# Patient Record
Sex: MALE | Race: WHITE | Employment: FULL TIME | URBAN - METROPOLITAN AREA
[De-identification: names, ages, dates, MRNs, and addresses within clinical notes are randomized per-mention and may not be internally consistent; named-entity substitution may affect disease eponyms.]

---

## 2018-05-28 ENCOUNTER — APPOINTMENT (OUTPATIENT)
Dept: GENERAL RADIOLOGY | Age: 58
End: 2018-05-28
Payer: COMMERCIAL

## 2018-05-28 ENCOUNTER — HOSPITAL ENCOUNTER (EMERGENCY)
Age: 58
Discharge: HOME | End: 2018-05-28
Attending: EMERGENCY MEDICINE
Payer: COMMERCIAL

## 2018-05-28 VITALS
HEIGHT: 69 IN | TEMPERATURE: 98.1 F | OXYGEN SATURATION: 98 % | HEART RATE: 72 BPM | SYSTOLIC BLOOD PRESSURE: 110 MMHG | BODY MASS INDEX: 34.07 KG/M2 | DIASTOLIC BLOOD PRESSURE: 76 MMHG | WEIGHT: 230 LBS | RESPIRATION RATE: 16 BRPM

## 2018-05-28 DIAGNOSIS — S69.92XA INJURY OF LEFT HAND, INITIAL ENCOUNTER: Primary | ICD-10-CM

## 2018-05-28 DIAGNOSIS — S63.615A SPRAIN OF LEFT RING FINGER, UNSPECIFIED SITE OF FINGER, INITIAL ENCOUNTER: ICD-10-CM

## 2018-05-28 PROCEDURE — 99283 EMERGENCY DEPT VISIT LOW MDM: CPT

## 2018-05-28 PROCEDURE — 73140 X-RAY EXAM OF FINGER(S): CPT

## 2018-05-28 RX ORDER — NAPROXEN 500 MG/1
500 TABLET ORAL 2 TIMES DAILY
Qty: 30 TABLET | Refills: 0 | Status: SHIPPED | OUTPATIENT
Start: 2018-05-28 | End: 2018-06-12

## 2018-05-28 ASSESSMENT — PAIN DESCRIPTION - ORIENTATION
ORIENTATION: LEFT
ORIENTATION: LEFT

## 2018-05-28 ASSESSMENT — PAIN SCALES - GENERAL: PAINLEVEL_OUTOF10: 5

## 2018-05-28 ASSESSMENT — PAIN DESCRIPTION - LOCATION
LOCATION: FINGER (COMMENT WHICH ONE)
LOCATION: FINGER (COMMENT WHICH ONE)

## 2018-05-28 ASSESSMENT — PAIN DESCRIPTION - PAIN TYPE
TYPE: ACUTE PAIN
TYPE: ACUTE PAIN

## 2018-05-28 ASSESSMENT — PAIN - FUNCTIONAL ASSESSMENT: PAIN_FUNCTIONAL_ASSESSMENT: ADVANCED DEMENTIA

## 2018-05-30 ASSESSMENT — ENCOUNTER SYMPTOMS
EYE REDNESS: 0
VOMITING: 0
SHORTNESS OF BREATH: 0
NAUSEA: 0
ABDOMINAL PAIN: 0

## 2024-02-21 ENCOUNTER — OFFICE VISIT (OUTPATIENT)
Dept: UROLOGY | Facility: CLINIC | Age: 64
End: 2024-02-21
Payer: COMMERCIAL

## 2024-02-21 VITALS — BODY MASS INDEX: 31.01 KG/M2 | TEMPERATURE: 98 F | WEIGHT: 210 LBS

## 2024-02-21 DIAGNOSIS — N52.9 ERECTILE DYSFUNCTION, UNSPECIFIED ERECTILE DYSFUNCTION TYPE: ICD-10-CM

## 2024-02-21 DIAGNOSIS — Z09 ENCOUNTER FOR FOLLOW-UP: Primary | ICD-10-CM

## 2024-02-21 DIAGNOSIS — N48.6 PEYRONIE'S DISEASE: ICD-10-CM

## 2024-02-21 PROCEDURE — 99215 OFFICE O/P EST HI 40 MIN: CPT | Performed by: UROLOGY

## 2024-02-21 PROCEDURE — 87086 URINE CULTURE/COLONY COUNT: CPT

## 2024-02-21 PROCEDURE — 1036F TOBACCO NON-USER: CPT | Performed by: UROLOGY

## 2024-02-21 RX ORDER — LOSARTAN POTASSIUM 50 MG/1
50 TABLET ORAL DAILY
COMMUNITY
Start: 2023-12-11

## 2024-02-21 RX ORDER — CHLORHEXIDINE GLUCONATE 40 MG/ML
SOLUTION TOPICAL DAILY PRN
Status: CANCELLED | OUTPATIENT
Start: 2024-02-21

## 2024-02-21 RX ORDER — BLOOD-GLUCOSE METER
KIT MISCELLANEOUS
COMMUNITY
Start: 2023-12-11

## 2024-02-21 RX ORDER — ACETAMINOPHEN 325 MG/1
975 TABLET ORAL ONCE
Status: CANCELLED | OUTPATIENT
Start: 2024-02-21 | End: 2024-02-21

## 2024-02-21 RX ORDER — METOPROLOL TARTRATE 25 MG/1
25 TABLET, FILM COATED ORAL 2 TIMES DAILY
COMMUNITY

## 2024-02-21 RX ORDER — FLUCONAZOLE 2 MG/ML
400 INJECTION, SOLUTION INTRAVENOUS ONCE
Status: CANCELLED | OUTPATIENT
Start: 2024-02-21 | End: 2024-02-21

## 2024-02-21 RX ORDER — GABAPENTIN 600 MG/1
600 TABLET ORAL ONCE
Status: CANCELLED | OUTPATIENT
Start: 2024-02-21 | End: 2024-02-21

## 2024-02-21 RX ORDER — SODIUM CHLORIDE, SODIUM LACTATE, POTASSIUM CHLORIDE, CALCIUM CHLORIDE 600; 310; 30; 20 MG/100ML; MG/100ML; MG/100ML; MG/100ML
100 INJECTION, SOLUTION INTRAVENOUS CONTINUOUS
Status: CANCELLED | OUTPATIENT
Start: 2024-02-21

## 2024-02-21 RX ORDER — ASPIRIN 81 MG/1
1 TABLET ORAL DAILY
COMMUNITY
Start: 2021-03-29

## 2024-02-21 RX ORDER — ATORVASTATIN CALCIUM 40 MG/1
40 TABLET, FILM COATED ORAL NIGHTLY
COMMUNITY

## 2024-02-21 RX ORDER — CELECOXIB 400 MG/1
400 CAPSULE ORAL ONCE
Status: CANCELLED | OUTPATIENT
Start: 2024-02-21 | End: 2024-02-21

## 2024-02-21 RX ORDER — LANCETS 28 GAUGE
EACH MISCELLANEOUS
COMMUNITY
Start: 2023-12-11

## 2024-02-21 RX ORDER — FAMOTIDINE 20 MG/1
20 TABLET, FILM COATED ORAL 2 TIMES DAILY
COMMUNITY
Start: 2023-12-11

## 2024-02-21 NOTE — PROGRESS NOTES
HPI  63 y.o. with DM and HL referred by Dr. Mas for penile curvature      Last seen 12/2022:  -will plan on IPP and modeling (pending [employer] insurance verification)  -urine cx / PVR  -traditional reservoir placement  -Vanc/Zosyn/Fluconazole  -discussed improving glycemic control    Todays Visit:  #Peyronie's Disease  -no change  -curve still 50 degrees up      Duration of curvature- years   pain with erection- sometimes   which way does the curve go- up , not painful unless very erect   degree of curvature- 30   history of trauma- no      #Erectile Dysfunction  -interested in IPP, failed pills and injections  -60% rigidity with mix 5 --> 50 degrees dorsal  -condition not improving over past year, worsening  DM better controlled     -s/p prostatectomy: no  -on nitrates/NTG?: no  -smoker? quit 20 years   -hernia repair- no   -partner-       -Tried PDE5is?: Yes  ---Viagra/sildenafil - response: ineffective   ---Cialis/Tadalafil- response: Cialis 20 mg as needed- used a few times- effective   - Libido/Desire: strong  - been on Testosterone /anabolic steroids? no   -Premature or delayed ejaculation: none     Labs reviewed: T normal  UCx negative     Labs 5/2022:  A1c 5.5  Cr .6     Raising their grandkids - 3  works in  at [employer] - [employer] - works 16 days, has 12 off     Current Medications:  No current outpatient medications on file.     No current facility-administered medications for this visit.        PMH:  No past medical history on file.    PSH:  No past surgical history on file.    FMH:  No family history on file.    Allergies:  Not on File    Physical Exam   CONSTITUTIONAL:        No acute distress    HEAD:        Normocephalic and atraumatic    CHEST / RESPIRATORY      no excess work of breathing, no respiratory distress,    ABDOMEN / GASTROINTESTINAL:        Abdomen nondistended    Testicles descended bilaterally, nontender, no masses  Vasa palpable bilaterally  Penis  circ'd, no lesions, dorsal plaque noted      Assessment/Plan  #Severe Erectile Dysfunction and Peyronies disease refractory to injections and pills - We discussed the etiology and natural progression of PD. Would not be candidate for Xiaflex given poor erections. Not a candidate for ICI given poor respose.      We had a long discussion regarding penile prostheses , including risks, benefits, and alternatives. We discussed risks including bleeding, infection, damage to adjacent structures, need for further procedures, malfunction, erosion, extrusion, etc. We discussed the postoperative course and expectations, and specifically that after getting an implant, other methods such as injections etc are no longer effective. All questions were answered and reading materials were given. T     -will plan on IPP and modeling (NB)  -urine cx / PVR  -traditional reservoir placement  -Vanc/Zosyn/Fluconazole  -no lloyd postop     #diabetes  -discussed improving glycemic control   -A1c today  -cr today    fu for surgery        Scribe Attestation  By signing my name below, I, Helen Patel-Rudy, Shari, attest that this documentation  has been prepared under the direction and in the presence of Charla Yang MD.

## 2024-02-22 PROBLEM — N48.6 PEYRONIE'S DISEASE: Status: ACTIVE | Noted: 2024-02-21

## 2024-02-22 PROBLEM — N52.9 ERECTILE DYSFUNCTION: Status: ACTIVE | Noted: 2024-02-21

## 2024-02-22 LAB — BACTERIA UR CULT: NO GROWTH

## 2024-03-18 ENCOUNTER — TELEMEDICINE CLINICAL SUPPORT (OUTPATIENT)
Dept: PREADMISSION TESTING | Facility: HOSPITAL | Age: 64
End: 2024-03-18
Payer: COMMERCIAL

## 2024-03-18 DIAGNOSIS — N52.9 ERECTILE DYSFUNCTION, UNSPECIFIED ERECTILE DYSFUNCTION TYPE: ICD-10-CM

## 2024-03-18 DIAGNOSIS — N48.6 PEYRONIE'S DISEASE: ICD-10-CM

## 2024-03-18 DIAGNOSIS — Z09 ENCOUNTER FOR FOLLOW-UP: ICD-10-CM

## 2024-03-18 RX ORDER — CHOLECALCIFEROL (VITAMIN D3) 50 MCG
50 TABLET ORAL DAILY
COMMUNITY

## 2024-03-18 RX ORDER — ZINC GLUCONATE 50 MG
50 TABLET ORAL DAILY
COMMUNITY

## 2024-03-18 RX ORDER — BISMUTH SUBSALICYLATE 262 MG
1 TABLET,CHEWABLE ORAL DAILY
COMMUNITY

## 2024-03-18 RX ORDER — IBUPROFEN 100 MG/5ML
2000 SUSPENSION, ORAL (FINAL DOSE FORM) ORAL DAILY
COMMUNITY

## 2024-03-18 RX ORDER — ACETAMINOPHEN, DIPHENHYDRAMINE HCL, PHENYLEPHRINE HCL 325; 25; 5 MG/1; MG/1; MG/1
1 TABLET ORAL NIGHTLY PRN
COMMUNITY

## 2024-03-18 RX ORDER — KRILL/OM-3/DHA/EPA/PHOSPHO/AST 1000-170MG
CAPSULE ORAL
COMMUNITY
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
## 2024-03-22 ENCOUNTER — OFFICE VISIT (OUTPATIENT)
Dept: UROLOGY | Facility: HOSPITAL | Age: 64
End: 2024-03-22
Payer: COMMERCIAL

## 2024-03-22 DIAGNOSIS — N48.6 PEYRONIE'S DISEASE: ICD-10-CM

## 2024-03-22 DIAGNOSIS — N52.9 ERECTILE DYSFUNCTION, UNSPECIFIED ERECTILE DYSFUNCTION TYPE: Primary | ICD-10-CM

## 2024-03-22 LAB
POC APPEARANCE, URINE: CLEAR
POC BILIRUBIN, URINE: NEGATIVE
POC BLOOD, URINE: ABNORMAL
POC COLOR, URINE: YELLOW
POC GLUCOSE, URINE: NEGATIVE MG/DL
POC KETONES, URINE: ABNORMAL MG/DL
POC LEUKOCYTES, URINE: NEGATIVE
POC NITRITE,URINE: NEGATIVE
POC PH, URINE: 5.5 PH
POC PROTEIN, URINE: NEGATIVE MG/DL
POC SPECIFIC GRAVITY, URINE: >=1.03
POC UROBILINOGEN, URINE: 0.2 EU/DL

## 2024-03-22 PROCEDURE — 99213 OFFICE O/P EST LOW 20 MIN: CPT | Performed by: NURSE PRACTITIONER

## 2024-03-22 PROCEDURE — 81003 URINALYSIS AUTO W/O SCOPE: CPT | Performed by: NURSE PRACTITIONER

## 2024-03-22 PROCEDURE — 1036F TOBACCO NON-USER: CPT | Performed by: NURSE PRACTITIONER

## 2024-03-26 ENCOUNTER — PRE-ADMISSION TESTING (OUTPATIENT)
Dept: PREADMISSION TESTING | Facility: HOSPITAL | Age: 64
End: 2024-03-26
Payer: COMMERCIAL

## 2024-03-26 ENCOUNTER — LAB (OUTPATIENT)
Dept: LAB | Facility: LAB | Age: 64
End: 2024-03-26
Payer: COMMERCIAL

## 2024-03-26 VITALS
OXYGEN SATURATION: 94 % | TEMPERATURE: 97.2 F | RESPIRATION RATE: 18 BRPM | SYSTOLIC BLOOD PRESSURE: 124 MMHG | HEART RATE: 83 BPM | HEIGHT: 69 IN | BODY MASS INDEX: 31.35 KG/M2 | DIASTOLIC BLOOD PRESSURE: 87 MMHG | WEIGHT: 211.64 LBS

## 2024-03-26 DIAGNOSIS — R73.09 ELEVATED RANDOM BLOOD GLUCOSE LEVEL: Primary | ICD-10-CM

## 2024-03-26 DIAGNOSIS — R73.09 ELEVATED RANDOM BLOOD GLUCOSE LEVEL: ICD-10-CM

## 2024-03-26 DIAGNOSIS — Z01.818 PREPROCEDURAL EXAMINATION: ICD-10-CM

## 2024-03-26 LAB
ANION GAP SERPL CALC-SCNC: 14 MMOL/L (ref 10–20)
APPEARANCE UR: CLEAR
BASOPHILS # BLD AUTO: 0.04 X10*3/UL (ref 0–0.1)
BASOPHILS NFR BLD AUTO: 0.8 %
BILIRUB UR STRIP.AUTO-MCNC: NEGATIVE MG/DL
BUN SERPL-MCNC: 18 MG/DL (ref 6–23)
CALCIUM SERPL-MCNC: 9.6 MG/DL (ref 8.6–10.3)
CHLORIDE SERPL-SCNC: 100 MMOL/L (ref 98–107)
CO2 SERPL-SCNC: 27 MMOL/L (ref 21–32)
COLOR UR: NORMAL
CREAT SERPL-MCNC: 0.89 MG/DL (ref 0.5–1.3)
EGFRCR SERPLBLD CKD-EPI 2021: >90 ML/MIN/1.73M*2
EOSINOPHIL # BLD AUTO: 0.16 X10*3/UL (ref 0–0.7)
EOSINOPHIL NFR BLD AUTO: 3.2 %
ERYTHROCYTE [DISTWIDTH] IN BLOOD BY AUTOMATED COUNT: 11.8 % (ref 11.5–14.5)
EST. AVERAGE GLUCOSE BLD GHB EST-MCNC: 154 MG/DL
GLUCOSE SERPL-MCNC: 176 MG/DL (ref 74–99)
GLUCOSE UR STRIP.AUTO-MCNC: NORMAL MG/DL
HBA1C MFR BLD: 7 %
HCT VFR BLD AUTO: 44.1 % (ref 41–52)
HGB BLD-MCNC: 15.4 G/DL (ref 13.5–17.5)
IMM GRANULOCYTES # BLD AUTO: 0.01 X10*3/UL (ref 0–0.7)
IMM GRANULOCYTES NFR BLD AUTO: 0.2 % (ref 0–0.9)
KETONES UR STRIP.AUTO-MCNC: NEGATIVE MG/DL
LEUKOCYTE ESTERASE UR QL STRIP.AUTO: NEGATIVE
LYMPHOCYTES # BLD AUTO: 2 X10*3/UL (ref 1.2–4.8)
LYMPHOCYTES NFR BLD AUTO: 39.7 %
MCH RBC QN AUTO: 32 PG (ref 26–34)
MCHC RBC AUTO-ENTMCNC: 34.9 G/DL (ref 32–36)
MCV RBC AUTO: 92 FL (ref 80–100)
MONOCYTES # BLD AUTO: 0.56 X10*3/UL (ref 0.1–1)
MONOCYTES NFR BLD AUTO: 11.1 %
NEUTROPHILS # BLD AUTO: 2.27 X10*3/UL (ref 1.2–7.7)
NEUTROPHILS NFR BLD AUTO: 45 %
NITRITE UR QL STRIP.AUTO: NEGATIVE
NRBC BLD-RTO: 0 /100 WBCS (ref 0–0)
PH UR STRIP.AUTO: 5 [PH]
PLATELET # BLD AUTO: 199 X10*3/UL (ref 150–450)
POTASSIUM SERPL-SCNC: 4.6 MMOL/L (ref 3.5–5.3)
PROT UR STRIP.AUTO-MCNC: NEGATIVE MG/DL
RBC # BLD AUTO: 4.82 X10*6/UL (ref 4.5–5.9)
RBC # UR STRIP.AUTO: NEGATIVE /UL
SODIUM SERPL-SCNC: 136 MMOL/L (ref 136–145)
SP GR UR STRIP.AUTO: 1.02
UROBILINOGEN UR STRIP.AUTO-MCNC: NORMAL MG/DL
WBC # BLD AUTO: 5 X10*3/UL (ref 4.4–11.3)

## 2024-03-26 PROCEDURE — 99203 OFFICE O/P NEW LOW 30 MIN: CPT | Performed by: NURSE PRACTITIONER

## 2024-03-26 PROCEDURE — 85025 COMPLETE CBC W/AUTO DIFF WBC: CPT

## 2024-03-26 PROCEDURE — 36415 COLL VENOUS BLD VENIPUNCTURE: CPT

## 2024-03-26 PROCEDURE — 81003 URINALYSIS AUTO W/O SCOPE: CPT

## 2024-03-26 PROCEDURE — 87081 CULTURE SCREEN ONLY: CPT | Mod: AHULAB | Performed by: NURSE PRACTITIONER

## 2024-03-26 PROCEDURE — 80048 BASIC METABOLIC PNL TOTAL CA: CPT

## 2024-03-26 PROCEDURE — 83036 HEMOGLOBIN GLYCOSYLATED A1C: CPT

## 2024-03-26 RX ORDER — FEXOFENADINE HCL 60 MG
60 TABLET ORAL DAILY
COMMUNITY

## 2024-03-26 RX ORDER — CHLORHEXIDINE GLUCONATE ORAL RINSE 1.2 MG/ML
15 SOLUTION DENTAL DAILY
Qty: 30 ML | Refills: 0 | Status: SHIPPED | OUTPATIENT
Start: 2024-03-26 | End: 2024-04-04

## 2024-03-26 ASSESSMENT — ENCOUNTER SYMPTOMS
CARDIOVASCULAR NEGATIVE: 1
GASTROINTESTINAL NEGATIVE: 1
NECK NEGATIVE: 1
MUSCULOSKELETAL NEGATIVE: 1
RESPIRATORY NEGATIVE: 1
CONSTITUTIONAL NEGATIVE: 1

## 2024-03-26 NOTE — PREPROCEDURE INSTRUCTIONS
Medication List            Accurate as of March 26, 2024 10:37 AM. Always use your most recent med list.                ascorbic acid 1,000 mg tablet  Commonly known as: Vitamin C  Medication Adjustments for Surgery: Stop 7 days before surgery     aspirin 81 mg EC tablet  Medication Adjustments for Surgery: Stop 7 days before surgery     atorvastatin 40 mg tablet  Commonly known as: Lipitor  Medication Adjustments for Surgery: Take morning of surgery with sip of water, no other fluids     chlorhexidine 0.12 % solution  Commonly known as: Peridex  Use 15 mL in the mouth or throat once daily for 2 days.  Notes to patient: Use as directed      famotidine 20 mg tablet  Commonly known as: Pepcid  Medication Adjustments for Surgery: Take morning of surgery with sip of water, no other fluids     fexofenadine 60 mg tablet  Commonly known as: Allegra  Medication Adjustments for Surgery: Take morning of surgery with sip of water, no other fluids     FreeStyle Lancets 28 gauge  Generic drug: FreeStyle lancets  Notes to patient: Use as directed      FreeStyle Lite Meter kit  Generic drug: Blood glucose monitoring meter kit  Notes to patient: Use as directed      FreeStyle Lite Strips strip  Generic drug: blood sugar diagnostic  Notes to patient: Use as directed      krill oil 1,077-801-93-80 mg capsule  Generic drug: krill-om-3-dha-epa-phospho-ast  Medication Adjustments for Surgery: Stop 7 days before surgery     losartan 50 mg tablet  Commonly known as: Cozaar  Medication Adjustments for Surgery: Continue until night before surgery     melatonin 10 mg tablet  Medication Adjustments for Surgery: Continue until night before surgery     metoprolol tartrate 25 mg tablet  Commonly known as: Lopressor  Medication Adjustments for Surgery: Take morning of surgery with sip of water, no other fluids     multivitamin tablet  Medication Adjustments for Surgery: Stop 7 days before surgery     Vitamin D3 50 MCG (2000 UT) tablet  Generic  drug: cholecalciferol  Medication Adjustments for Surgery: Continue until night before surgery     zinc gluconate 50 mg tablet  Medication Adjustments for Surgery: Continue until night before surgery                   CONTACT SURGEON'S OFFICE IF YOU DEVELOP:  * Fever = 100.4 F   * New respiratory symptoms (e.g. cough, shortness of breath, respiratory distress, sore throat)  * Recent loss of taste or smell  *Flu like symptoms such as headache, fatigue or gastrointestinal symptoms  * You develop any open sores, shingles, burning or painful urination   AND/OR:  * You no longer wish to have the surgery.  * Any other personal circumstances change that may lead to the need to cancel or defer this surgery.  *You were admitted to any hospital within one week of your planned procedure.    SMOKING:  *Quitting smoking can make a huge difference to your health and recovery from surgery.    *If you need help with quitting, call 4-967-QUIT-NOW.    THE DAY BEFORE SURGERY:  *Do not eat any food after midnight the night before surgery.   *You are permitted to drink clear liquids (i.e. water, black coffee, tea, clear broth, apple juice) up to 2 hours before your surgery.  DIABETICS:  Please check fasting blood sugar  upon waking up.  If fasting sugar is <80 mg/dl, please drink 100ml/3oz of apple juice no later than 2 hours prior to surgery.      SURGICAL TIME  *You will be contacted between 2 p.m. and 6 p.m. the business day before your surgery with your arrival time.  *If you haven't received a call by 6pm, call [phone].  *Scheduled surgery times may change and you will be notified if this occurs-check your personal voicemail for any updates.    ON THE MORNING OF SURGERY:  *Wear comfortable, loose fitting clothing.   *Do not use moisturizers, creams, lotions or perfume.  *All jewelry and valuables should be left at home.  *Prosthetic devices such as contact lenses, hearing aids, dentures, eyelash extensions, hairpins and body  piercing must be removed before surgery.    BRING WITH YOU:  *Photo ID and insurance card  *Current list of medicines and allergies  *Pacemaker/Defibrillator/Heart stent cards  *CPAP machine and mask  *Slings/splints/crutches  *Copy of your complete Advanced Directive/DHPOA-if applicable  *Neurostimulator implant remote    PARKING AND ARRIVAL:  *Check in at the Main Entrance desk and let them know you are here for surgery.  *You will be directed to the 2nd floor surgical waiting area.    AFTER OUTPATIENT SURGERY:  *A responsible adult MUST accompany you at the time of discharge and stay with you for 24 hours after your surgery.  *You may NOT drive yourself home after surgery.  *You may use a taxi or ride sharing service ([employer], Uber) to return home ONLY if you are accompanied by a friend or family member.  *Instructions for resuming your medications will be provided by your surgeon.        YOU HAVE REVIEWED THE MEDICATIONS ON THIS SHEET AND YOU VERIFY THESE ARE ALL THE MEDICATIONS AND OVER THE COUNTER MEDICATIONS THAT YOU TAKE .

## 2024-03-28 LAB — STAPHYLOCOCCUS SPEC CULT: NORMAL

## 2024-04-01 DIAGNOSIS — Z41.9 SURGERY, ELECTIVE: ICD-10-CM

## 2024-04-01 RX ORDER — SULFAMETHOXAZOLE AND TRIMETHOPRIM 800; 160 MG/1; MG/1
1 TABLET ORAL 2 TIMES DAILY
Qty: 20 TABLET | Refills: 0 | Status: SHIPPED | OUTPATIENT
Start: 2024-04-01 | End: 2024-04-11

## 2024-04-04 ENCOUNTER — ANESTHESIA EVENT (OUTPATIENT)
Dept: OPERATING ROOM | Facility: HOSPITAL | Age: 64
End: 2024-04-04
Payer: COMMERCIAL

## 2024-04-04 ENCOUNTER — HOSPITAL ENCOUNTER (OUTPATIENT)
Facility: HOSPITAL | Age: 64
Setting detail: OUTPATIENT SURGERY
Discharge: HOME | End: 2024-04-04
Attending: UROLOGY | Admitting: UROLOGY
Payer: COMMERCIAL

## 2024-04-04 ENCOUNTER — ANESTHESIA (OUTPATIENT)
Dept: OPERATING ROOM | Facility: HOSPITAL | Age: 64
End: 2024-04-04
Payer: COMMERCIAL

## 2024-04-04 VITALS
DIASTOLIC BLOOD PRESSURE: 97 MMHG | WEIGHT: 214.29 LBS | BODY MASS INDEX: 31.74 KG/M2 | SYSTOLIC BLOOD PRESSURE: 140 MMHG | HEART RATE: 67 BPM | TEMPERATURE: 96.8 F | RESPIRATION RATE: 14 BRPM | HEIGHT: 69 IN | OXYGEN SATURATION: 99 %

## 2024-04-04 DIAGNOSIS — N48.6 PEYRONIE'S DISEASE: ICD-10-CM

## 2024-04-04 DIAGNOSIS — N52.03 COMBINED ARTERIAL INSUFFICIENCY AND CORPORO-VENOUS OCCLUSIVE ERECTILE DYSFUNCTION: Primary | ICD-10-CM

## 2024-04-04 DIAGNOSIS — N52.9 ERECTILE DYSFUNCTION, UNSPECIFIED ERECTILE DYSFUNCTION TYPE: ICD-10-CM

## 2024-04-04 DIAGNOSIS — G89.18 ACUTE POST-OPERATIVE PAIN: ICD-10-CM

## 2024-04-04 DIAGNOSIS — Z09 ENCOUNTER FOR FOLLOW-UP: ICD-10-CM

## 2024-04-04 PROBLEM — K21.9 GASTROESOPHAGEAL REFLUX DISEASE: Status: ACTIVE | Noted: 2024-04-04

## 2024-04-04 PROBLEM — E66.9 OBESITY: Status: ACTIVE | Noted: 2024-04-04

## 2024-04-04 PROBLEM — E11.9 DIABETES MELLITUS, TYPE 2 (MULTI): Status: ACTIVE | Noted: 2024-04-04

## 2024-04-04 PROBLEM — E78.5 HYPERLIPIDEMIA: Status: ACTIVE | Noted: 2024-04-04

## 2024-04-04 PROBLEM — G47.33 OSA (OBSTRUCTIVE SLEEP APNEA): Status: ACTIVE | Noted: 2024-04-04

## 2024-04-04 PROBLEM — I10 HTN (HYPERTENSION): Status: ACTIVE | Noted: 2024-04-04

## 2024-04-04 PROBLEM — I82.409 DVT (DEEP VENOUS THROMBOSIS) (MULTI): Status: ACTIVE | Noted: 2024-04-04

## 2024-04-04 PROBLEM — E04.9 GOITER: Status: ACTIVE | Noted: 2024-04-04

## 2024-04-04 LAB
GLUCOSE BLD MANUAL STRIP-MCNC: 185 MG/DL (ref 74–99)
GLUCOSE BLD MANUAL STRIP-MCNC: 191 MG/DL (ref 74–99)

## 2024-04-04 PROCEDURE — A4217 STERILE WATER/SALINE, 500 ML: HCPCS | Performed by: UROLOGY

## 2024-04-04 PROCEDURE — 2500000004 HC RX 250 GENERAL PHARMACY W/ HCPCS (ALT 636 FOR OP/ED): Performed by: ANESTHESIOLOGIST ASSISTANT

## 2024-04-04 PROCEDURE — 2500000004 HC RX 250 GENERAL PHARMACY W/ HCPCS (ALT 636 FOR OP/ED): Performed by: UROLOGY

## 2024-04-04 PROCEDURE — C1813 PROSTHESIS, PENILE, INFLATAB: HCPCS | Performed by: UROLOGY

## 2024-04-04 PROCEDURE — 2720000007 HC OR 272 NO HCPCS: Performed by: UROLOGY

## 2024-04-04 PROCEDURE — 7100000001 HC RECOVERY ROOM TIME - INITIAL BASE CHARGE: Performed by: UROLOGY

## 2024-04-04 PROCEDURE — A54405 PR INSERT,INFLATABLE PENILE PROSTHESIS: Performed by: ANESTHESIOLOGIST ASSISTANT

## 2024-04-04 PROCEDURE — 3600000003 HC OR TIME - INITIAL BASE CHARGE - PROCEDURE LEVEL THREE: Performed by: UROLOGY

## 2024-04-04 PROCEDURE — 2500000005 HC RX 250 GENERAL PHARMACY W/O HCPCS: Performed by: ANESTHESIOLOGY

## 2024-04-04 PROCEDURE — 2780000003 HC OR 278 NO HCPCS: Performed by: UROLOGY

## 2024-04-04 PROCEDURE — 2500000005 HC RX 250 GENERAL PHARMACY W/O HCPCS: Performed by: ANESTHESIOLOGIST ASSISTANT

## 2024-04-04 PROCEDURE — 3600000008 HC OR TIME - EACH INCREMENTAL 1 MINUTE - PROCEDURE LEVEL THREE: Performed by: UROLOGY

## 2024-04-04 PROCEDURE — 54360 PENIS PLASTIC SURGERY: CPT | Performed by: UROLOGY

## 2024-04-04 PROCEDURE — 2500000001 HC RX 250 WO HCPCS SELF ADMINISTERED DRUGS (ALT 637 FOR MEDICARE OP): Performed by: ANESTHESIOLOGY

## 2024-04-04 PROCEDURE — 54235 NJX CORPORA CAVERNOSA RX AGT: CPT | Performed by: UROLOGY

## 2024-04-04 PROCEDURE — 7100000010 HC PHASE TWO TIME - EACH INCREMENTAL 1 MINUTE: Performed by: UROLOGY

## 2024-04-04 PROCEDURE — 2500000005 HC RX 250 GENERAL PHARMACY W/O HCPCS: Performed by: UROLOGY

## 2024-04-04 PROCEDURE — 2500000001 HC RX 250 WO HCPCS SELF ADMINISTERED DRUGS (ALT 637 FOR MEDICARE OP): Performed by: UROLOGY

## 2024-04-04 PROCEDURE — 54110 TREATMENT OF PENIS LESION: CPT | Performed by: UROLOGY

## 2024-04-04 PROCEDURE — A54405 PR INSERT,INFLATABLE PENILE PROSTHESIS: Performed by: ANESTHESIOLOGY

## 2024-04-04 PROCEDURE — 54405 INSERT MULTI-COMP PENIS PROS: CPT | Performed by: UROLOGY

## 2024-04-04 PROCEDURE — 3700000001 HC GENERAL ANESTHESIA TIME - INITIAL BASE CHARGE: Performed by: UROLOGY

## 2024-04-04 PROCEDURE — 7100000002 HC RECOVERY ROOM TIME - EACH INCREMENTAL 1 MINUTE: Performed by: UROLOGY

## 2024-04-04 PROCEDURE — 7100000009 HC PHASE TWO TIME - INITIAL BASE CHARGE: Performed by: UROLOGY

## 2024-04-04 PROCEDURE — 82947 ASSAY GLUCOSE BLOOD QUANT: CPT

## 2024-04-04 PROCEDURE — 3700000002 HC GENERAL ANESTHESIA TIME - EACH INCREMENTAL 1 MINUTE: Performed by: UROLOGY

## 2024-04-04 DEVICE — SCROTAL ZERO DEGREE ANGLE CYLINDER SET WITH PUMP
Type: IMPLANTABLE DEVICE | Site: PENIS | Status: FUNCTIONAL
Brand: TITAN

## 2024-04-04 DEVICE — CL RESERVOIR
Type: IMPLANTABLE DEVICE | Site: PENIS | Status: FUNCTIONAL
Brand: TITAN

## 2024-04-04 DEVICE — IMPLANTABLE DEVICE
Type: IMPLANTABLE DEVICE | Site: PENIS | Status: FUNCTIONAL
Brand: TITAN

## 2024-04-04 RX ORDER — SODIUM CHLORIDE, SODIUM LACTATE, POTASSIUM CHLORIDE, CALCIUM CHLORIDE 600; 310; 30; 20 MG/100ML; MG/100ML; MG/100ML; MG/100ML
100 INJECTION, SOLUTION INTRAVENOUS CONTINUOUS
Status: DISCONTINUED | OUTPATIENT
Start: 2024-04-04 | End: 2024-04-04 | Stop reason: HOSPADM

## 2024-04-04 RX ORDER — CHLORHEXIDINE GLUCONATE 40 MG/ML
SOLUTION TOPICAL DAILY PRN
Status: DISCONTINUED | OUTPATIENT
Start: 2024-04-04 | End: 2024-04-04 | Stop reason: HOSPADM

## 2024-04-04 RX ORDER — METOPROLOL TARTRATE 1 MG/ML
3 INJECTION, SOLUTION INTRAVENOUS EVERY 5 MIN PRN
Status: COMPLETED | OUTPATIENT
Start: 2024-04-04 | End: 2024-04-04

## 2024-04-04 RX ORDER — IBUPROFEN 600 MG/1
600 TABLET ORAL EVERY 6 HOURS PRN
Qty: 60 TABLET | Refills: 0 | Status: SHIPPED | OUTPATIENT
Start: 2024-04-04

## 2024-04-04 RX ORDER — ONDANSETRON HYDROCHLORIDE 2 MG/ML
INJECTION, SOLUTION INTRAVENOUS AS NEEDED
Status: DISCONTINUED | OUTPATIENT
Start: 2024-04-04 | End: 2024-04-04

## 2024-04-04 RX ORDER — ONDANSETRON HYDROCHLORIDE 2 MG/ML
4 INJECTION, SOLUTION INTRAVENOUS ONCE AS NEEDED
Status: DISCONTINUED | OUTPATIENT
Start: 2024-04-04 | End: 2024-04-04 | Stop reason: HOSPADM

## 2024-04-04 RX ORDER — OXYCODONE HYDROCHLORIDE 5 MG/1
5 TABLET ORAL EVERY 6 HOURS PRN
Qty: 10 TABLET | Refills: 0 | Status: SHIPPED | OUTPATIENT
Start: 2024-04-04

## 2024-04-04 RX ORDER — SODIUM CHLORIDE, SODIUM LACTATE, POTASSIUM CHLORIDE, CALCIUM CHLORIDE 600; 310; 30; 20 MG/100ML; MG/100ML; MG/100ML; MG/100ML
INJECTION, SOLUTION INTRAVENOUS CONTINUOUS PRN
Status: DISCONTINUED | OUTPATIENT
Start: 2024-04-04 | End: 2024-04-04

## 2024-04-04 RX ORDER — LIDOCAINE HYDROCHLORIDE 10 MG/ML
0.1 INJECTION, SOLUTION EPIDURAL; INFILTRATION; INTRACAUDAL; PERINEURAL ONCE
Status: DISCONTINUED | OUTPATIENT
Start: 2024-04-04 | End: 2024-04-04 | Stop reason: HOSPADM

## 2024-04-04 RX ORDER — CELECOXIB 200 MG/1
400 CAPSULE ORAL ONCE
Status: COMPLETED | OUTPATIENT
Start: 2024-04-04 | End: 2024-04-04

## 2024-04-04 RX ORDER — FENTANYL CITRATE 50 UG/ML
INJECTION, SOLUTION INTRAMUSCULAR; INTRAVENOUS AS NEEDED
Status: DISCONTINUED | OUTPATIENT
Start: 2024-04-04 | End: 2024-04-04

## 2024-04-04 RX ORDER — MEPERIDINE HYDROCHLORIDE 25 MG/ML
12.5 INJECTION INTRAMUSCULAR; INTRAVENOUS; SUBCUTANEOUS EVERY 10 MIN PRN
Status: DISCONTINUED | OUTPATIENT
Start: 2024-04-04 | End: 2024-04-04 | Stop reason: HOSPADM

## 2024-04-04 RX ORDER — FLUCONAZOLE 2 MG/ML
400 INJECTION, SOLUTION INTRAVENOUS ONCE
Status: COMPLETED | OUTPATIENT
Start: 2024-04-04 | End: 2024-04-04

## 2024-04-04 RX ORDER — ACETAMINOPHEN 325 MG/1
650 TABLET ORAL EVERY 6 HOURS PRN
Qty: 60 TABLET | Refills: 0 | Status: SHIPPED | OUTPATIENT
Start: 2024-04-04

## 2024-04-04 RX ORDER — SODIUM CHLORIDE 0.9 G/100ML
IRRIGANT IRRIGATION AS NEEDED
Status: DISCONTINUED | OUTPATIENT
Start: 2024-04-04 | End: 2024-04-04 | Stop reason: HOSPADM

## 2024-04-04 RX ORDER — GABAPENTIN 300 MG/1
600 CAPSULE ORAL ONCE
Status: COMPLETED | OUTPATIENT
Start: 2024-04-04 | End: 2024-04-04

## 2024-04-04 RX ORDER — MIDAZOLAM HYDROCHLORIDE 1 MG/ML
INJECTION INTRAMUSCULAR; INTRAVENOUS AS NEEDED
Status: DISCONTINUED | OUTPATIENT
Start: 2024-04-04 | End: 2024-04-04

## 2024-04-04 RX ORDER — PROPOFOL 10 MG/ML
INJECTION, EMULSION INTRAVENOUS AS NEEDED
Status: DISCONTINUED | OUTPATIENT
Start: 2024-04-04 | End: 2024-04-04

## 2024-04-04 RX ORDER — ACETAMINOPHEN 325 MG/1
975 TABLET ORAL ONCE
Status: COMPLETED | OUTPATIENT
Start: 2024-04-04 | End: 2024-04-04

## 2024-04-04 RX ORDER — OXYCODONE HYDROCHLORIDE 5 MG/1
5 TABLET ORAL EVERY 4 HOURS PRN
Status: DISCONTINUED | OUTPATIENT
Start: 2024-04-04 | End: 2024-04-04 | Stop reason: HOSPADM

## 2024-04-04 RX ORDER — LIDOCAINE HYDROCHLORIDE 20 MG/ML
INJECTION, SOLUTION INFILTRATION; PERINEURAL AS NEEDED
Status: DISCONTINUED | OUTPATIENT
Start: 2024-04-04 | End: 2024-04-04

## 2024-04-04 RX ADMIN — FENTANYL CITRATE 25 MCG: 50 INJECTION, SOLUTION INTRAMUSCULAR; INTRAVENOUS at 11:14

## 2024-04-04 RX ADMIN — LIDOCAINE HYDROCHLORIDE 100 MG: 20 INJECTION, SOLUTION INFILTRATION; PERINEURAL at 10:47

## 2024-04-04 RX ADMIN — PROPOFOL 50 MG: 10 INJECTION, EMULSION INTRAVENOUS at 11:15

## 2024-04-04 RX ADMIN — DEXAMETHASONE SODIUM PHOSPHATE 4 MG: 4 INJECTION, SOLUTION INTRAMUSCULAR; INTRAVENOUS at 12:10

## 2024-04-04 RX ADMIN — PROPOFOL 150 MG: 10 INJECTION, EMULSION INTRAVENOUS at 10:47

## 2024-04-04 RX ADMIN — ONDANSETRON 4 MG: 2 INJECTION INTRAMUSCULAR; INTRAVENOUS at 12:10

## 2024-04-04 RX ADMIN — ACETAMINOPHEN 975 MG: 325 TABLET ORAL at 08:35

## 2024-04-04 RX ADMIN — OXYCODONE HYDROCHLORIDE 5 MG: 5 TABLET ORAL at 13:14

## 2024-04-04 RX ADMIN — SODIUM CHLORIDE, POTASSIUM CHLORIDE, SODIUM LACTATE AND CALCIUM CHLORIDE: 600; 310; 30; 20 INJECTION, SOLUTION INTRAVENOUS at 10:35

## 2024-04-04 RX ADMIN — CELECOXIB 400 MG: 200 CAPSULE ORAL at 08:35

## 2024-04-04 RX ADMIN — FLUCONAZOLE 400 MG: 400 INJECTION, SOLUTION INTRAVENOUS at 08:26

## 2024-04-04 RX ADMIN — VANCOMYCIN HYDROCHLORIDE 1500 MG: 1.5 INJECTION, POWDER, LYOPHILIZED, FOR SOLUTION INTRAVENOUS at 08:26

## 2024-04-04 RX ADMIN — SODIUM CHLORIDE, POTASSIUM CHLORIDE, SODIUM LACTATE AND CALCIUM CHLORIDE 100 ML/HR: 600; 310; 30; 20 INJECTION, SOLUTION INTRAVENOUS at 08:26

## 2024-04-04 RX ADMIN — FENTANYL CITRATE 25 MCG: 50 INJECTION, SOLUTION INTRAMUSCULAR; INTRAVENOUS at 12:24

## 2024-04-04 RX ADMIN — GABAPENTIN 600 MG: 300 CAPSULE ORAL at 08:35

## 2024-04-04 RX ADMIN — FENTANYL CITRATE 50 MCG: 50 INJECTION, SOLUTION INTRAMUSCULAR; INTRAVENOUS at 11:15

## 2024-04-04 RX ADMIN — PIPERACILLIN SODIUM AND TAZOBACTAM SODIUM 3.38 G: 3; .375 INJECTION, SOLUTION INTRAVENOUS at 10:48

## 2024-04-04 RX ADMIN — MIDAZOLAM HYDROCHLORIDE 2 MG: 1 INJECTION, SOLUTION INTRAMUSCULAR; INTRAVENOUS at 10:35

## 2024-04-04 RX ADMIN — METOPROLOL TARTRATE 3 MG: 5 INJECTION INTRAVENOUS at 08:26

## 2024-04-04 ASSESSMENT — PAIN - FUNCTIONAL ASSESSMENT
PAIN_FUNCTIONAL_ASSESSMENT: 0-10
PAIN_FUNCTIONAL_ASSESSMENT: VAS (VISUAL ANALOG SCALE)
PAIN_FUNCTIONAL_ASSESSMENT: 0-10
PAIN_FUNCTIONAL_ASSESSMENT: VAS (VISUAL ANALOG SCALE)
PAIN_FUNCTIONAL_ASSESSMENT: 0-10

## 2024-04-04 ASSESSMENT — PAIN SCALES - GENERAL
PAIN_LEVEL: 0
PAINLEVEL_OUTOF10: 3
PAINLEVEL_OUTOF10: 0 - NO PAIN
PAINLEVEL_OUTOF10: 3
PAINLEVEL_OUTOF10: 3
PAINLEVEL_OUTOF10: 0 - NO PAIN
PAINLEVEL_OUTOF10: 3
PAINLEVEL_OUTOF10: 0 - NO PAIN
PAINLEVEL_OUTOF10: 0 - NO PAIN

## 2024-04-04 ASSESSMENT — ENCOUNTER SYMPTOMS
FEVER: 0
DIZZINESS: 0
SHORTNESS OF BREATH: 0
COUGH: 0
PALPITATIONS: 0
CONFUSION: 0
HEADACHES: 0
CHILLS: 0

## 2024-04-04 ASSESSMENT — COLUMBIA-SUICIDE SEVERITY RATING SCALE - C-SSRS
6. HAVE YOU EVER DONE ANYTHING, STARTED TO DO ANYTHING, OR PREPARED TO DO ANYTHING TO END YOUR LIFE?: NO
1. IN THE PAST MONTH, HAVE YOU WISHED YOU WERE DEAD OR WISHED YOU COULD GO TO SLEEP AND NOT WAKE UP?: NO
2. HAVE YOU ACTUALLY HAD ANY THOUGHTS OF KILLING YOURSELF?: NO

## 2024-04-04 NOTE — ANESTHESIA POSTPROCEDURE EVALUATION
Patient: Matthew Hall    Procedure Summary       Date: 04/04/24 Room / Location: [facility] A OR 01 / Virtual [facility] A OR    Anesthesia Start: 1037 Anesthesia Stop: 1235    Procedure: Penile Prosthesis Insertion (Penis) Diagnosis:       Peyronie's disease      Erectile dysfunction, unspecified erectile dysfunction type      (Peyronie's disease [N48.6])      (Erectile dysfunction, unspecified erectile dysfunction type [N52.9])    Surgeons: Charla Yang MD Responsible Provider: Shahram Ramos MD    Anesthesia Type: general ASA Status: 3            Anesthesia Type: general    Vitals Value Taken Time   /87 04/04/24 1241   Temp 36.8 °C (98.2 °F) 04/04/24 1229   Pulse 66 04/04/24 1242   Resp 12 04/04/24 1229   SpO2 98 % 04/04/24 1242   Vitals shown include unvalidated device data.    Anesthesia Post Evaluation    Patient location during evaluation: bedside  Patient participation: complete - patient cannot participate  Level of consciousness: awake  Pain score: 0  Pain management: adequate  Airway patency: patent  Cardiovascular status: acceptable  Respiratory status: acceptable  Hydration status: acceptable  Postoperative Nausea and Vomiting: none        No notable events documented.

## 2024-04-04 NOTE — DISCHARGE INSTRUCTIONS
POST-OPERATIVE INSTRUCTIONS: PENILE PROSTHESIS INSERTION     Anesthesia Side Effects:  You received general anesthesia today.  You may feel sleepy, tired, or have a sore throat. You may also feel drowsiness, dizziness, or inability to think clearly.    For your safety, do not drive, drink alcoholic beverages, take any unprescribed medication or make any important decisions for 24 hours.  A responsible adult should be with you for 24 hours.     Pain Control:    Tylenol (acetaminophen) or ibuprofen can be taken every 6 hours until bedtime.   You may have been prescribed a narcotic such as tramadol or oxycodone for pain. This can cause constipation, drowsiness, fatigue, and confusion. Take a stool softener such as Colace to avoid constipation.   You may use an ice pack (or bag of frozen veggies) over your underwear to the surgical area to decrease swelling and pain.    Activity:  The device is purposefully left partially inflated (approximately 30 to 40 percent). This will be deflated at the two week visit. Do not attempt to deflate or inflate your device until you are seen at your six week follow-up visit to be taught how to use the device.  No sexual activity until you have been cleared by your surgeon.  No strenuous activity until cleared by your surgeon. Do not lift anything more than 10 pounds for the next 4 weeks. you should stay on light duty if you are still requiring pain medications.   You may return to school/work about 1 week after surgery.    Diet:  There are no dietary restrictions after surgery but some nausea on the day of surgery is normal. Try liquids and light meals the first day.  Make sure to drink plenty of fluids to keep hydrated.     Wound Care:  You may shower 48 hours after surgery. No baths, pools, hot tubs etc. for at least 4 weeks.   Pull the scrotal pump straight down towards the ground in the shower when the scrotal skin is supple. You should also do this every time you go to the  bathroom This will allow the pump to heal in a nice dependent position.  Keep the incisions clean and dry after you shower. Do not put any lotions/creams/ointments etc. over the incision.   Wear tight fitting supportive underwear for the first 2 weeks after surgery to help prevent swelling and decrease discomfort. You may put gauze over the incisions so that the scrotal support does not irritate the incisions.   If you have a drain in place, we will schedule an appointment for the drain to be removed.  All stitches will dissolve. The skin glue over the incision will flake off over the next few days. Avoid picking this off; it is okay to let soap and water run over it.  Use gauze in underwear to collect any drainage. Blood tinged drainage is expected and should decrease daily.     When to Call the Surgeon:  Fever higher than 100?F.  Repeated vomiting.  Pain not controlled with medication.  Active bleeding or excessive drainage from incision(s) -- some drainage staining gauze in the underwear is expected.  Call [phone] if you have questions.   After 5 PM or on weekends and holidays, call the hospital  at [phone] and ask for the [id number].  In case of emergency, call [phone].    Follow-Up:  Please call [phone] to schedule a post-op check in 1-2 weeks after surgery or to reschedule. If you are waiting for pathology results, you will receive them at this appointment.

## 2024-04-04 NOTE — ANESTHESIA PROCEDURE NOTES
Airway  Date/Time: 4/4/2024 10:49 AM  Urgency: elective    Airway not difficult    Staffing  Performed: DOUGLAS   Authorized by: Shahram Ramos MD    Performed by: DOUGLAS Chester  Patient location during procedure: OR    Indications and Patient Condition  Spontaneous ventilation: present  Sedation level: moderate (conscious sedation)  Preoxygenated: yes  Patient position: sniffing  Mask difficulty assessment: 0 - not attempted  Planned trial extubation    Final Airway Details  Final airway type: supraglottic airway      Successful airway: Size 5     Number of attempts at approach: 1

## 2024-04-04 NOTE — ANESTHESIA PREPROCEDURE EVALUATION
Patient: Matthew Hall    Procedure Information       Date/Time: 04/04/24 0930    Procedure: Penile Prosthesis Insertion (Penis)    Location: [facility] A OR 01 / Virtual [facility] A OR    Surgeons: Charla Yang MD            Relevant Problems   Anesthesia (within normal limits)      Cardiac   (+) HTN (hypertension)   (+) Hyperlipidemia      Pulmonary   (+) SHELLEY (obstructive sleep apnea) (Uses CPAP)      Neuro (within normal limits)      GI   (+) Gastroesophageal reflux disease      /Renal (within normal limits)      Liver (within normal limits)      Endocrine   (+) Diabetes mellitus, type 2 (CMS/HCC)   (+) Goiter   (+) Obesity       Clinical information reviewed:   Tobacco  Allergies  Meds   Med Hx  Surg Hx   Fam Hx  Soc Hx        NPO Detail:  NPO/Void Status  Carbohydrate Drink Given Prior to Surgery? : N  Date of Last Liquid: 04/03/24  Time of Last Liquid: 2300  Date of Last Solid: 04/03/24  Time of Last Solid: 2000  Last Intake Type: Clear fluids  Time of Last Void: 0803         Physical Exam    Airway  Mallampati: II     Cardiovascular    Dental    Pulmonary    Abdominal            Anesthesia Plan    History of general anesthesia?: yes  History of complications of general anesthesia?: no    ASA 3     general     intravenous induction   Anesthetic plan and risks discussed with patient.
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
## 2024-04-04 NOTE — OP NOTE
Penile Prosthesis Insertion Operative Note     Date: 2024  OR Location: [facility] A OR    Name: Matthew Hall, : 1960, Age: 63 y.o., MRN: [id number], Sex: male    Diagnosis  Pre-op Diagnosis     * Peyronie's disease [N48.6]     * Erectile dysfunction, unspecified erectile dysfunction type [N52.9] Post-op Diagnosis     * Peyronie's disease [N48.6]     * Erectile dysfunction, unspecified erectile dysfunction type [N52.9]     Procedures  Penile Prosthesis Insertion  35677 - NJ INSJ MULTI-COMPONENT INFLATABLE PENILE PROSTH    N[place] NJX C/P/A CAVERNOSA W/PHARMACOLOGIC AGT [53861]    Disruption of Peyronie's Plaque with Rossello Dilators and Scratch Technique - CPT 83918 with modifier 59 / WILHELM - This is a disctinct procedure not typically performed for penile prosthesis placement and carries and additional risk of penile and urethral injury  Plastic operation on penis to correct angulation (Penile Modeling)  - CPT 39502 with modifier 59 / WILHELM - This is a disctinct procedure not typically performed for penile prosthesis placement and carries and additional risk of penile and urethral injury.    Surgeons      * Charla Yang - Primary    Resident/Fellow/Other Assistant:  Surgeon(s) and Role:     * Santiago Salgado MD - Resident - Assisting    Procedure Summary  Anesthesia: General  ASA: III  Anesthesia Staff: Anesthesiologist: Shahram Ramos MD  C-AA: DOUGLAS Chester  Estimated Blood Loss: 75mL  Intra-op Medications:   Administrations occurring from 0930 to 1130 on 24:   Medication Name Total Dose   BUPivacaine HCl (Marcaine) 0.5 % (5 mg/mL) 29 mL, dexAMETHasone (Decadron) 1 mg syringe 30 mL   BUPivacaine HCl (Marcaine) 0.5 % (5 mg/mL) 30 mL in sodium chloride 0.9% 100 mL syringe 60 mL   piperacillin-tazobactam-dextrose (Zosyn) IV 3.375 g 3.375 g   vancomycin (Vancocin) in dextrose 5 % water (D5W) 500 mL IV 1,500 mg Cannot be calculated              Anesthesia Record               Intraprocedure I/O  Totals          Intake    LR infusion 900.00 mL    Total Intake 900 mL       Output    Urine 500 mL    Est. Blood Loss 25 mL    Total Output 525 mL       Net    Net Volume 375 mL          Specimen: No specimens collected     Staff:   Circulator: Carolyne Tellez RN  Scrub Person: Gisel Ivy RN         Drains and/or Catheters:   Closed/Suction Drain Other (Comment) 15 Fr. (Active)       Closed/Suction Drain Groin Bulb (Active)       [REMOVED] Urethral Catheter Non-latex 16 Fr. (Removed)       Tourniquet Times:         Implants:  Implants       Type Name Action Serial No.       RESERVOIR, TITAN CL, WITH LOCK OUT VALVE, 125CC - SN/A - [id number] Implanted N/A     Other Assembly Kit Implanted N/A      CYLINDER PUMP SET, TITAN ZERO DEGREE, SCROTAL, 20CM - SN/A - [id number] Implanted N/A     Other Rear Timp Extenders Implanted N/A              Findings: Penile prosthesis insertion via penoscrotal approach with 20cm cylinders with 1cm rear tip extenders bilaterally. Reservoir in the left space of retzius with 100cc in the balloon. Right inguinal TAMMI drain in place, no lloyd.    Indications: Matthew Hall is an 63 y.o. male who is having surgery for Peyronie's disease [N48.6]  Erectile dysfunction, unspecified erectile dysfunction type [N52.9].     The patient was seen in the preoperative area. The risks, benefits, complications, treatment options, non-operative alternatives, expected recovery and outcomes were discussed with the patient. The possibilities of reaction to medication, pulmonary aspiration, injury to surrounding structures, bleeding, recurrent infection, the need for additional procedures, failure to diagnose a condition, and creating a complication requiring transfusion or operation were discussed with the patient. The patient concurred with the proposed plan, giving informed consent.  The site of surgery was properly noted/marked if necessary per policy. The patient has been actively warmed in preoperative  area. Preoperative antibiotics have been ordered and given within 1 hours of incision. Venous thrombosis prophylaxis have been ordered including bilateral sequential compression devices    Procedure Details: Insertion of penile prosthesis, penile modeling, Disruption of Peyronie's Plaque with Rossello Dilators and Scratch Technique - CPT 22998 with modifier 59 / WILHELM - This is a disctinct procedure not typically performed for penile prosthesis placement and carries and additional risk of penile and urethral injury  Plastic operation on penis to correct angulation (Penile Modeling)  - CPT 02351 with modifier 59 / WILHELM - This is a disctinct procedure not typically performed for penile prosthesis placement and carries and additional risk of penile and urethral injury.      Complications:  None; patient tolerated the procedure well.    Disposition: PACU - hemodynamically stable.  Condition: stable         Additional Details:   PROCEDURE:  Placement of Coloplast Titan Zero Degree 3-piece inflatable penile prosthesis, injection of corpora cavernosa with pharmacologic agent     Cylinders:  20 cm     Rear tips: 1 cm on the left, 1 cm on the right     Reservoir: 100 mL    Site: left space of retzius      The patient was correctly identified in the preoperative holding area and informed consent was obtained and documented.  He was examined in the preoperative area to ensure he had no scrotal or perineal rashes or skin infections. He received preoperative antibiotics in the form of vancomycin, zosyn, and fluconazole IV per pharmacy protocol. He was then brought to the operating room and placed on the table in supine position.  After a universal timeout, SCDs were placed and after adequate induction, general anesthesia was given. The external genitalia was shaved with clippers making sure not damage the scrotal or penile skin.  A penile block and bilateral pudendal block was performed with a total of 30 mL of 0.5% Marcaine. The  patient was washed with betadine solution. The patient was then prepped with Chloroprep and drapped in a multilayer surgical fashion.     A 16 Yemeni Vivas catheter was placed sterilely on the field and the balloon inflated with 10 cc of sterile water. A Lenin retractor was brought onto the field and the urethral hook was placed at the 12 o'clock position of the meatus and penis was placed on stretch. A 3 cm incision was made sharply with the scalpel approximately one fingerbreadth below the penoscrotal junction in a transverse fashion. All surgeons then changed their top layer of gloves. [place] were then used with the Lenin retractor to properly expose the field.      The incision was then brought down to the dartos fascia using Bovie electrocautery layer by layer until the corpora cavernosa were exposed.  2-0 Vicryl holding sutures were placed and corporotomies were made using Bovie electrocautery.  Lidocaine and Saline was injected into the corpora. There was noted to be ~50 degree dorsal curvature. The corpora were easily dilated with the #12 Bardales dilators. The dorsal Peyronie's plaque was disrupted bilaterally using the Rossello dilators as well as using closed metzenbaum scissors to internally scratch the plaque and release the scar tissue.     Using the Dilamez insert the patient's corpora were measured to be:  RIGHT: 8.5 cm in the proximal direction and 12 cm in the distal direction  LEFT: 8.5 cm in the proximal direction and 12 cm in the distal direction.    Given the history of Peyronie's and the expectation of the plaque to relax over time, we decided to place 20 cm cylinders with 1 cm rear tips bilaterally.      We then turned our attention to the space of Retzius.  This was bluntly dissected on the left hand side by the surgeon.  The space was created through which we placed the reservoir.  The reservoir was then filled with 100 mL of sterile saline. Copious amounts of antibiotic solution were used  during this dissection and placement.    Once this was complete, the penile prosthesis was prepared off the field and brought onto the field in a Tigsit bag filled with antibiotic solution. The penile prosthesis cylinders were placed first proximally and then distally using a Nasim needle on a Kecia passer.  The prosthesis was inflated and there was no buckling of the cylinders from the corporotomies.     Penile Modeling was then performed. The cylinders were inflated fully, rubber shods placed on the tubing to pump, and the penis bent against the remaining curve which was over 30 degrees. This was done repeatedly, taking care to avoid urethral injury at the fossa. After each 30s session, additional pumps were able to be placed into the implant.     The stay sutures were able to be approximated without tension. We continued with the 21cm total cylinder length. The holding sutures were then tied down to close the corporotomies. Copious amounts of Irriscept  used for irrigation during this part of the procedure.    A Claudette clamp and nasal speculum were used to develop a dartos pouch in which the penile prosthesis pump was placed. The pump was placed in the most dependent region of the scrotum and held down.  The tubing was then connected using connectors from the penile prosthesis kit.  Intervening tissue was closed using 2-0 Vicryl suture.      A TAMIM drain was placed on the right hand side of the groin through a stab incision and placed dependently over the pump.  The dartos was then closed in a running fashion using a 2-0 Vicryl suture after copious amounts of antibiotic irrigation.  The skin was then reapproximated using 4-0 Monocryl sutures in a simple interrupted fashion. The penile prosthesis was deflated. The wound was then cleaned and dried and dermabond was applied. The Vivas catheter was removed.  Fluffs and a jockstrap were applied.    The patient was then awoken from general anesthesia and seemed to  tolerate the procedure quite well. He was transferred to the stretcher and brought back to the PACU in stable condition.       Attending Attestation:     Charla Yang  Phone Number: [phone]

## 2024-04-04 NOTE — PERIOPERATIVE NURSING NOTE
1300 Assumed care of patient. Awake and alert. Complaining of mild discomfort to penis. Left hand PIV was pulled out by patient. TAMMI drain to right groin emptied for 35mL serosanguinous drainage. Blood sugar checked, 191, reported to Dr. Ramos, no action needed at this time.     1314 Tolerating PO. Medicated for mild discomfort with oxycodone. Wife updated via phone call. Will need to stay in PACU until 2pm due to history of SHELLEY.     1400 Cleared to transfer to Phase 2 per Dr. Ramos.     1415 Meets criteria for Phase 2.

## 2024-04-08 ENCOUNTER — OFFICE VISIT (OUTPATIENT)
Dept: UROLOGY | Facility: HOSPITAL | Age: 64
End: 2024-04-08
Payer: COMMERCIAL

## 2024-04-08 DIAGNOSIS — N52.9 ERECTILE DYSFUNCTION, UNSPECIFIED ERECTILE DYSFUNCTION TYPE: Primary | ICD-10-CM

## 2024-04-08 PROCEDURE — 4010F ACE/ARB THERAPY RXD/TAKEN: CPT | Performed by: NURSE PRACTITIONER

## 2024-04-08 PROCEDURE — 3051F HG A1C>EQUAL 7.0%<8.0%: CPT | Performed by: NURSE PRACTITIONER

## 2024-04-08 PROCEDURE — 99024 POSTOP FOLLOW-UP VISIT: CPT | Performed by: NURSE PRACTITIONER

## 2024-04-08 NOTE — LETTER
April 8, 2024     Patient: Matthew Hall   YOB: 1960   Date of Visit: 4/8/2024       To Whom It May Concern:    Matthew Hall was seen in my clinic on 4/8/2024 at 10:40 am. Please excuse Matthew for his absence from work on this day to make the appointment. He may return to work 04/18/2024.    If you have any questions or concerns, please don't hesitate to call.         Sincerely,         MIK De Souza-CNP        CC: No Recipients
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
## 2024-04-23 NOTE — PROGRESS NOTES
Today's visit:  #Erectile Dysfunction / PD  -S/p penile implant and modeling on 4/4/24  - reports soreness in the area but denies sharp pains  -denies fever, chills, nausea, vomiting, pus, and hematuria  - he has been pulling on it in the shower    -failed pills and injections  -60% rigidity with mix 5 --> 50 degrees dorsal  -condition not improving over past year, worsening  DM better controlled     -s/p prostatectomy: no  -on nitrates/NTG?: no  -smoker? quit 20 years   -hernia repair- no   -partner-         Raising their grandkids - 3  works in  at [employer] - [employer] - works 16 days, has 12 off       Labs  Lab Results   Component Value Date    LH 3.6 05/18/2021     Lab Results   Component Value Date    FSH 7.5 05/18/2021     Lab Results   Component Value Date    PROLACTIN 7.4 05/18/2021     Lab Results   Component Value Date    HGBA1C 7.0 (H) 03/26/2024     Medications:    Current Outpatient Medications:     acetaminophen (Tylenol) 325 mg tablet, Take 2 tablets (650 mg) by mouth every 6 hours if needed for mild pain (1 - 3)., Disp: 60 tablet, Rfl: 0    ascorbic acid (Vitamin C) 1,000 mg tablet, Take 2 tablets (2,000 mg) by mouth once daily., Disp: , Rfl:     aspirin 81 mg EC tablet, Take 1 tablet (81 mg) by mouth once daily., Disp: , Rfl:     atorvastatin (Lipitor) 40 mg tablet, Take 1 tablet (40 mg) by mouth once daily at bedtime., Disp: , Rfl:     cholecalciferol (Vitamin D3) 50 MCG (2000 UT) tablet, Take 1 tablet (50 mcg) by mouth once daily., Disp: , Rfl:     DOCOSAHEXAENOIC ACID ORAL, Take by mouth., Disp: , Rfl:     famotidine (Pepcid) 20 mg tablet, Take 1 tablet (20 mg) by mouth 2 times a day., Disp: , Rfl:     fexofenadine (Allegra) 60 mg tablet, Take 1 tablet (60 mg) by mouth once daily., Disp: , Rfl:     FreeStyle Lancets 28 gauge, USE TO CHECK BLOOD SUGAR ONCE DAILY, Disp: , Rfl:     FreeStyle Lite Meter kit, USE TO TEST BLOOD SUGAR DAILY, Disp: , Rfl:     FreeStyle Lite Strips  strip, USE TO TEST BLOOD SUGAR EVERY DAY, Disp: , Rfl:     ibuprofen 600 mg tablet, Take 1 tablet (600 mg) by mouth every 6 hours if needed for mild pain (1 - 3)., Disp: 60 tablet, Rfl: 0    krill-om-3-dha-epa-phospho-ast (krill oil) 1,036-998-95-80 mg capsule, Take by mouth., Disp: , Rfl:     losartan (Cozaar) 50 mg tablet, Take 1 tablet (50 mg) by mouth once daily., Disp: , Rfl:     melatonin 10 mg tablet, Take 1 tablet (10 mg) by mouth as needed at bedtime., Disp: , Rfl:     metoprolol tartrate (Lopressor) 25 mg tablet, Take 1 tablet (25 mg) by mouth 2 times a day., Disp: , Rfl:     multivitamin tablet, Take 1 tablet by mouth once daily., Disp: , Rfl:     oxyCODONE (Roxicodone) 5 mg immediate release tablet, Take 1 tablet (5 mg) by mouth every 6 hours if needed for severe pain (7 - 10)., Disp: 10 tablet, Rfl: 0    zinc gluconate 50 mg tablet, Take 1 tablet (50 mg of elemental zinc) by mouth once daily., Disp: , Rfl:     Allergy:  No Known Allergies     Exam  Significant bruising still present.  Implant: tips properly placed, inflates and deflates properly   Pump in place, nonfixed, able to inflate and deflate   No signs of infection erosion or extrusion     Assessment/Plan  #Severe Erectile Dysfunction and Peyronies disease refractory to injections and pills   -s/p penile prosthesis insertion on 4/4/24  - doing well, significant bruising still present.    Fu in a week    Scribe Attestation  By signing my name below, I, Shari Morelos, attest that this documentation has been prepared under the direction and in the presence of Charla Yang MD.

## 2024-04-24 ENCOUNTER — OFFICE VISIT (OUTPATIENT)
Dept: UROLOGY | Facility: CLINIC | Age: 64
End: 2024-04-24
Payer: COMMERCIAL

## 2024-04-24 VITALS — TEMPERATURE: 97.6 F

## 2024-04-24 DIAGNOSIS — N52.9 ERECTILE DYSFUNCTION, UNSPECIFIED ERECTILE DYSFUNCTION TYPE: ICD-10-CM

## 2024-04-24 DIAGNOSIS — N48.6 PEYRONIE'S DISEASE: Primary | ICD-10-CM

## 2024-04-24 PROCEDURE — 1036F TOBACCO NON-USER: CPT | Performed by: UROLOGY

## 2024-04-24 PROCEDURE — 3051F HG A1C>EQUAL 7.0%<8.0%: CPT | Performed by: UROLOGY

## 2024-04-24 PROCEDURE — 4010F ACE/ARB THERAPY RXD/TAKEN: CPT | Performed by: UROLOGY

## 2024-04-24 PROCEDURE — 99024 POSTOP FOLLOW-UP VISIT: CPT | Performed by: UROLOGY

## 2024-04-24 ASSESSMENT — PAIN SCALES - GENERAL: PAINLEVEL: 0-NO PAIN

## 2024-04-30 ENCOUNTER — OFFICE VISIT (OUTPATIENT)
Dept: UROLOGY | Facility: CLINIC | Age: 64
End: 2024-04-30
Payer: COMMERCIAL

## 2024-04-30 VITALS — BODY MASS INDEX: 31.1 KG/M2 | HEIGHT: 69 IN | WEIGHT: 210 LBS | TEMPERATURE: 97.7 F

## 2024-04-30 DIAGNOSIS — N48.6 PEYRONIE'S DISEASE: ICD-10-CM

## 2024-04-30 DIAGNOSIS — Z96.0 S/P INSERTION OF PENILE IMPLANT: Primary | ICD-10-CM

## 2024-04-30 DIAGNOSIS — N52.9 ERECTILE DYSFUNCTION, UNSPECIFIED ERECTILE DYSFUNCTION TYPE: ICD-10-CM

## 2024-04-30 DIAGNOSIS — Z09 ENCOUNTER FOR FOLLOW-UP: ICD-10-CM

## 2024-04-30 PROCEDURE — 99024 POSTOP FOLLOW-UP VISIT: CPT | Performed by: UROLOGY

## 2024-04-30 PROCEDURE — 1036F TOBACCO NON-USER: CPT | Performed by: UROLOGY

## 2024-04-30 PROCEDURE — 3051F HG A1C>EQUAL 7.0%<8.0%: CPT | Performed by: UROLOGY

## 2024-04-30 PROCEDURE — 4010F ACE/ARB THERAPY RXD/TAKEN: CPT | Performed by: UROLOGY

## 2024-04-30 ASSESSMENT — PAIN SCALES - GENERAL: PAINLEVEL: 0-NO PAIN

## 2024-04-30 NOTE — PROGRESS NOTES
HPI  63 y.o. with DM and HL referred by Dr. Mas for penile curvature      Last seen 12/2022:  -will plan on IPP and modeling (pending [employer] insurance verification)  -urine cx / PVR  -traditional reservoir placement  -Vanc/Zosyn/Fluconazole  -discussed improving glycemic control        Today's visit:  #Erectile Dysfunction / PD  -S/p coloplast IPP 20cm cylinders with 1cm rear tip extenders bilaterally, reservoir in the left space of retzius with 100cc, penoscrotal + modeling 4/4/24  -bruising/swelling/pain much improved  -pain controlled with ibuprofen  -denies n/v,f/c, gross hematuria       -failed pills and injections  -60% rigidity with mix 5 --> 50 degrees dorsal  -condition not improving over past year, worsening  -s/p prostatectomy: no  -on nitrates/NTG?: no  -smoker? 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       Medications:    Current Outpatient Medications:     acetaminophen (Tylenol) 325 mg tablet, Take 2 tablets (650 mg) by mouth every 6 hours if needed for mild pain (1 - 3)., Disp: 60 tablet, Rfl: 0    ascorbic acid (Vitamin C) 1,000 mg tablet, Take 2 tablets (2,000 mg) by mouth once daily., Disp: , Rfl:     aspirin 81 mg EC tablet, Take 1 tablet (81 mg) by mouth once daily., Disp: , Rfl:     atorvastatin (Lipitor) 40 mg tablet, Take 1 tablet (40 mg) by mouth once daily at bedtime., Disp: , Rfl:     cholecalciferol (Vitamin D3) 50 MCG (2000 UT) tablet, Take 1 tablet (50 mcg) by mouth once daily., Disp: , Rfl:     DOCOSAHEXAENOIC ACID ORAL, Take by mouth., Disp: , Rfl:     famotidine (Pepcid) 20 mg tablet, Take 1 tablet (20 mg) by mouth 2 times a day., Disp: , Rfl:     fexofenadine (Allegra) 60 mg tablet, Take 1 tablet (60 mg) by mouth once daily., Disp: , Rfl:     FreeStyle Lancets 28 gauge, USE TO CHECK BLOOD SUGAR ONCE DAILY, Disp: , Rfl:     FreeStyle Lite Meter kit,  USE TO TEST BLOOD SUGAR DAILY, Disp: , Rfl:     FreeStyle Lite Strips strip, USE TO TEST BLOOD SUGAR EVERY DAY, Disp: , Rfl:     ibuprofen 600 mg tablet, Take 1 tablet (600 mg) by mouth every 6 hours if needed for mild pain (1 - 3)., Disp: 60 tablet, Rfl: 0    krill-om-3-dha-epa-phospho-ast (krill oil) 1,385-580-83-80 mg capsule, Take by mouth., Disp: , Rfl:     losartan (Cozaar) 50 mg tablet, Take 1 tablet (50 mg) by mouth once daily., Disp: , Rfl:     melatonin 10 mg tablet, Take 1 tablet (10 mg) by mouth as needed at bedtime., Disp: , Rfl:     metoprolol tartrate (Lopressor) 25 mg tablet, Take 1 tablet (25 mg) by mouth 2 times a day., Disp: , Rfl:     multivitamin tablet, Take 1 tablet by mouth once daily., Disp: , Rfl:     oxyCODONE (Roxicodone) 5 mg immediate release tablet, Take 1 tablet (5 mg) by mouth every 6 hours if needed for severe pain (7 - 10)., Disp: 10 tablet, Rfl: 0    zinc gluconate 50 mg tablet, Take 1 tablet (50 mg of elemental zinc) by mouth once daily., Disp: , Rfl:     Allergy:  No Known Allergies     Exam  Significant bruising still present.  Implant: tips properly placed, inflates and deflates properly   Pump in place, nonfixed, able to inflate and deflate   No signs of infection erosion or extrusion   Much improved     Assessment/Plan  #Severe Erectile Dysfunction and Peyronies disease refractory to injections and pills   -S/p coloplast IPP 20cm cylinders with 1cm rear tip extenders bilaterally, reservoir in the left space of retzius with 100cc, penoscrotal + modeling 4/4/24  -inflated and deflated today  -advised patient to try inflating and deflating in the shower     Fu in a week    Scribe Attestation  By signing my name below, I, Helen Patel-Rudy, Scribe, attest that this documentation  has been prepared under the direction and in the presence of Charla Yang MD.

## 2024-05-07 ENCOUNTER — OFFICE VISIT (OUTPATIENT)
Dept: UROLOGY | Facility: CLINIC | Age: 64
End: 2024-05-07
Payer: COMMERCIAL

## 2024-05-07 VITALS — HEIGHT: 69 IN | BODY MASS INDEX: 31.01 KG/M2 | TEMPERATURE: 98 F

## 2024-05-07 DIAGNOSIS — Z96.0 S/P INSERTION OF PENILE IMPLANT: Primary | ICD-10-CM

## 2024-05-07 DIAGNOSIS — Z09 ENCOUNTER FOR FOLLOW-UP: ICD-10-CM

## 2024-05-07 DIAGNOSIS — N52.9 ERECTILE DYSFUNCTION, UNSPECIFIED ERECTILE DYSFUNCTION TYPE: ICD-10-CM

## 2024-05-07 DIAGNOSIS — N48.6 PEYRONIE'S DISEASE: ICD-10-CM

## 2024-05-07 PROCEDURE — 1036F TOBACCO NON-USER: CPT | Performed by: UROLOGY

## 2024-05-07 PROCEDURE — 3051F HG A1C>EQUAL 7.0%<8.0%: CPT | Performed by: UROLOGY

## 2024-05-07 PROCEDURE — 4010F ACE/ARB THERAPY RXD/TAKEN: CPT | Performed by: UROLOGY

## 2024-05-07 PROCEDURE — 99024 POSTOP FOLLOW-UP VISIT: CPT | Performed by: UROLOGY

## 2024-05-07 ASSESSMENT — PAIN SCALES - GENERAL: PAINLEVEL: 0-NO PAIN

## 2024-05-07 NOTE — PROGRESS NOTES
HPI  63 y.o. with DM and HL referred by Dr. Mas for penile curvature      Last seen 4/30/24:  -inflated and deflated today  -advised patient to try inflating and deflating in the shower         Today's visit:  #Erectile Dysfunction / PD  -S/p coloplast IPP 20cm cylinders with 1cm rear tip extenders bilaterally, reservoir in the left space of retzius with 100cc, penoscrotal + modeling 4/4/24  -has been icing regularly  -denies n/v/f/c, pud drainage  -feels like he is healing well  -some soreness, pain is controlled, not intolerable     -failed pills and injections  -60% rigidity with mix 5 --> 50 degrees dorsal  -condition not improving over past year, worsening  -s/p prostatectomy: no  -on nitrates/NTG?: no  -smoker? 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       Medications:    Current Outpatient Medications:     acetaminophen (Tylenol) 325 mg tablet, Take 2 tablets (650 mg) by mouth every 6 hours if needed for mild pain (1 - 3)., Disp: 60 tablet, Rfl: 0    ascorbic acid (Vitamin C) 1,000 mg tablet, Take 2 tablets (2,000 mg) by mouth once daily., Disp: , Rfl:     aspirin 81 mg EC tablet, Take 1 tablet (81 mg) by mouth once daily., Disp: , Rfl:     atorvastatin (Lipitor) 40 mg tablet, Take 1 tablet (40 mg) by mouth once daily at bedtime., Disp: , Rfl:     cholecalciferol (Vitamin D3) 50 MCG (2000 UT) tablet, Take 1 tablet (50 mcg) by mouth once daily., Disp: , Rfl:     DOCOSAHEXAENOIC ACID ORAL, Take by mouth., Disp: , Rfl:     famotidine (Pepcid) 20 mg tablet, Take 1 tablet (20 mg) by mouth 2 times a day., Disp: , Rfl:     fexofenadine (Allegra) 60 mg tablet, Take 1 tablet (60 mg) by mouth once daily., Disp: , Rfl:     FreeStyle Lancets 28 gauge, USE TO CHECK BLOOD SUGAR ONCE DAILY, Disp: , Rfl:     FreeStyle Lite Meter kit, USE TO TEST BLOOD SUGAR DAILY, Disp: , Rfl:     FreeStyle Lite  Strips strip, USE TO TEST BLOOD SUGAR EVERY DAY, Disp: , Rfl:     ibuprofen 600 mg tablet, Take 1 tablet (600 mg) by mouth every 6 hours if needed for mild pain (1 - 3)., Disp: 60 tablet, Rfl: 0    krill-om-3-dha-epa-phospho-ast (krill oil) 1,423-136-38-80 mg capsule, Take by mouth., Disp: , Rfl:     losartan (Cozaar) 50 mg tablet, Take 1 tablet (50 mg) by mouth once daily., Disp: , Rfl:     melatonin 10 mg tablet, Take 1 tablet (10 mg) by mouth as needed at bedtime., Disp: , Rfl:     metoprolol tartrate (Lopressor) 25 mg tablet, Take 1 tablet (25 mg) by mouth 2 times a day., Disp: , Rfl:     multivitamin tablet, Take 1 tablet by mouth once daily., Disp: , Rfl:     oxyCODONE (Roxicodone) 5 mg immediate release tablet, Take 1 tablet (5 mg) by mouth every 6 hours if needed for severe pain (7 - 10)., Disp: 10 tablet, Rfl: 0    zinc gluconate 50 mg tablet, Take 1 tablet (50 mg of elemental zinc) by mouth once daily., Disp: , Rfl:     Allergy:  No Known Allergies     Exam  Implant: tips properly placed, inflates and deflates properly   Pump in place, nonfixed, able to inflate and deflate   No signs of infection erosion or extrusion   Mild foreskin  swelling, left lateral aspect    Assessment/Plan  #Severe Erectile Dysfunction and Peyronies disease refractory to injections and pills   -S/p coloplast IPP 20cm cylinders with 1cm rear tip extenders bilaterally, reservoir in the left space of retzius with 100cc, penoscrotal + modeling 4/4/24  -inflated and deflated today  -inflate and deflate every other day  -continue ice prn   -wait 2 weeks for sex     Fu in 2 weeks     Scribe Attestation  By signing my name below, I, Helen Patel-Rudy, Scribe, attest that this documentation  has been prepared under the direction and in the presence of Charla Yang MD.

## 2024-05-21 ENCOUNTER — OFFICE VISIT (OUTPATIENT)
Dept: UROLOGY | Facility: CLINIC | Age: 64
End: 2024-05-21
Payer: COMMERCIAL

## 2024-05-21 VITALS — TEMPERATURE: 97.1 F

## 2024-05-21 DIAGNOSIS — Z96.0 S/P INSERTION OF PENILE IMPLANT: Primary | ICD-10-CM

## 2024-05-21 DIAGNOSIS — N48.6 PEYRONIE'S DISEASE: ICD-10-CM

## 2024-05-21 DIAGNOSIS — Z09 ENCOUNTER FOR FOLLOW-UP: ICD-10-CM

## 2024-05-21 DIAGNOSIS — N52.9 ERECTILE DYSFUNCTION, UNSPECIFIED ERECTILE DYSFUNCTION TYPE: ICD-10-CM

## 2024-05-21 PROCEDURE — 4010F ACE/ARB THERAPY RXD/TAKEN: CPT | Performed by: UROLOGY

## 2024-05-21 PROCEDURE — 99024 POSTOP FOLLOW-UP VISIT: CPT | Performed by: UROLOGY

## 2024-05-21 PROCEDURE — 3051F HG A1C>EQUAL 7.0%<8.0%: CPT | Performed by: UROLOGY

## 2024-05-21 PROCEDURE — 1036F TOBACCO NON-USER: CPT | Performed by: UROLOGY

## 2024-08-31 NOTE — PROGRESS NOTES
"Last visit 5/2024    Today's visit:  #Erectile Dysfunction / PD  -S/p coloplast IPP 20cm cylinders with 1cm rear tip extenders bilaterally, reservoir in the left space of retzius with 100cc, penoscrotal + modeling 4/4/24  -doing well  -pumping/deflating  -pain controlled  -having sex, wife here, happy with implant     -60% rigidity with mix 5 --> 50 degrees dorsal  -condition not improving over past year, worsening  -s/p prostatectomy: no  -on nitrates/NTG?: no     Raising their grandkids - 3  works in  at [employer] - [employer] - works 16 days, has 12 off       Labs  No results found for: \"TESTOSTERONE\"  Lab Results   Component Value Date    LH 3.6 05/18/2021     Lab Results   Component Value Date    FSH 7.5 05/18/2021     No components found for: \"ESTRADIAL\"  No results found for: \"PSA\"  No components found for: \"CBC\"  Lab Results   Component Value Date    PROLACTIN 7.4 05/18/2021     Lab Results   Component Value Date    HGBA1C 7.0 (H) 03/26/2024     No components found for: \"HEMATOCRIT\"      Medications:    Current Outpatient Medications:     acetaminophen (Tylenol) 325 mg tablet, Take 2 tablets (650 mg) by mouth every 6 hours if needed for mild pain (1 - 3)., Disp: 60 tablet, Rfl: 0    ascorbic acid (Vitamin C) 1,000 mg tablet, Take 2 tablets (2,000 mg) by mouth once daily., Disp: , Rfl:     aspirin 81 mg EC tablet, Take 1 tablet (81 mg) by mouth once daily., Disp: , Rfl:     atorvastatin (Lipitor) 40 mg tablet, Take 1 tablet (40 mg) by mouth once daily at bedtime., Disp: , Rfl:     cholecalciferol (Vitamin D3) 50 MCG (2000 UT) tablet, Take 1 tablet (50 mcg) by mouth once daily., Disp: , Rfl:     DOCOSAHEXAENOIC ACID ORAL, Take by mouth., Disp: , Rfl:     famotidine (Pepcid) 20 mg tablet, Take 1 tablet (20 mg) by mouth 2 times a day., Disp: , Rfl:     fexofenadine (Allegra) 60 mg tablet, Take 1 tablet (60 mg) by mouth once daily., Disp: , Rfl:     FreeStyle Lancets 28 gauge, USE TO CHECK BLOOD SUGAR " ONCE DAILY, Disp: , Rfl:     FreeStyle Lite Meter kit, USE TO TEST BLOOD SUGAR DAILY, Disp: , Rfl:     FreeStyle Lite Strips strip, USE TO TEST BLOOD SUGAR EVERY DAY, Disp: , Rfl:     ibuprofen 600 mg tablet, Take 1 tablet (600 mg) by mouth every 6 hours if needed for mild pain (1 - 3)., Disp: 60 tablet, Rfl: 0    krill-om-3-dha-epa-phospho-ast (krill oil) 1,329-291-22-80 mg capsule, Take by mouth., Disp: , Rfl:     losartan (Cozaar) 50 mg tablet, Take 1 tablet (50 mg) by mouth once daily., Disp: , Rfl:     melatonin 10 mg tablet, Take 1 tablet (10 mg) by mouth as needed at bedtime., Disp: , Rfl:     metoprolol tartrate (Lopressor) 25 mg tablet, Take 1 tablet (25 mg) by mouth 2 times a day., Disp: , Rfl:     multivitamin tablet, Take 1 tablet by mouth once daily., Disp: , Rfl:     oxyCODONE (Roxicodone) 5 mg immediate release tablet, Take 1 tablet (5 mg) by mouth every 6 hours if needed for severe pain (7 - 10)., Disp: 10 tablet, Rfl: 0    zinc gluconate 50 mg tablet, Take 1 tablet (50 mg of elemental zinc) by mouth once daily., Disp: , Rfl:     Allergy:  No Known Allergies     Exam  CONSTITUTIONAL:        No acute distress    HEAD:        Normocephalic and atraumatic    CHEST / RESPIRATORY      no excess work of breathing, no respiratory distress,    ABDOMEN / GASTROINTESTINAL:        Abdomen nondistended    Implant: tips properly placed, inflates and deflates properly   Pump in place, nonfixed, able to inflate and deflate   No signs of infection erosion or extrusion   Incision healed     Assessment/Plan  #Severe Erectile Dysfunction and Peyronies disease refractory to injections and pills   -S/p coloplast IPP 20cm cylinders with 1cm rear tip extenders bilaterally, reservoir in the left space of retzius with 100cc, penoscrotal + modeling 4/4/24  -inflated and deflated today, working well   \-warning signs reviewed    Fu prn

## 2024-09-03 ENCOUNTER — APPOINTMENT (OUTPATIENT)
Dept: UROLOGY | Facility: CLINIC | Age: 64
End: 2024-09-03
Payer: COMMERCIAL

## 2024-09-03 DIAGNOSIS — N52.9 ERECTILE DYSFUNCTION, UNSPECIFIED ERECTILE DYSFUNCTION TYPE: Primary | ICD-10-CM

## 2024-09-03 DIAGNOSIS — N48.6 PEYRONIE'S DISEASE: ICD-10-CM

## 2024-09-03 PROCEDURE — 3051F HG A1C>EQUAL 7.0%<8.0%: CPT | Performed by: UROLOGY

## 2024-09-03 PROCEDURE — 1036F TOBACCO NON-USER: CPT | Performed by: UROLOGY

## 2024-09-03 PROCEDURE — 99212 OFFICE O/P EST SF 10 MIN: CPT | Performed by: UROLOGY

## 2024-09-03 PROCEDURE — 4010F ACE/ARB THERAPY RXD/TAKEN: CPT | Performed by: UROLOGY

## (undated) DEVICE — GLOVE, SURGICAL, PROTEXIS PI , 7.0, PF, LF

## (undated) DEVICE — ADHESIVE, SKIN, LIQUIBAND EXCEED

## (undated) DEVICE — HEMOSTAT, ARISTA, ABSORBABLE, 3 GRAMS

## (undated) DEVICE — TOWEL PACK, STERILE, 4/PACK, BLUE

## (undated) DEVICE — DRESSING, GAUZE, FLUFF, 1 PLY, 18 X 36 IN

## (undated) DEVICE — SYRINGE, 50 CC, IRRIGATION, CATHETER TIP, DISPOSABLE, STERILE, LF

## (undated) DEVICE — SPONGE, LAP, XRAY DECT, 18IN X 18IN, W/MASTER DMT, STERILE

## (undated) DEVICE — GLOVE, SURGICAL, PROTEXIS PI BLUE W/NEUTHERA, 7.5, PF, LF

## (undated) DEVICE — BAG, DECANTER

## (undated) DEVICE — SUPPORTER, ATHLETIC, ADULT, X-LARGE

## (undated) DEVICE — TRAY, FOLEY, LUBRI-SIL, 16FR, COMPLETE CARE W/STATLOCK

## (undated) DEVICE — SYSTEM, PENILE IMPLANT, W/6 BLUNT TIP, 12MM STAY HOOKS

## (undated) DEVICE — CATHETER TRAY, URETHRAL, FOLEY, 2 WAY, GLOVES, PREP, LUBRICANT, DRAINAGE BAG, 16 FR, 5 CC, LF

## (undated) DEVICE — SUTURE, VICRYL, 2-0, 27 IN, UR-6, VIOLET

## (undated) DEVICE — DRAPE, TOWEL, STERI DRAPE, 17 X 11 IN, PLASTIC, STERILE

## (undated) DEVICE — SYRINGE, 60 CC, IRRIGATION, BULB, CONTRO-BULB, PAPER POUCH

## (undated) DEVICE — APPLICATOR, CHLORAPREP, W/ORANGE TINT, 26ML

## (undated) DEVICE — COUNTER, NEEDLE, FOAM BLOCK, POP-N-COUNT, W/BLADEGUARD, W/ADHESIVE 40 COUNT, RED

## (undated) DEVICE — DRAPE, ISOLATION, BAG, DRAW STRING CLOSURE, STERI DRAPE, LARGE, 20 X 20 IN, DISPOSABLE, STERILE

## (undated) DEVICE — DRESSING, ADHESIVE, ISLAND, TELFA, 2 X 3.75 IN, LF

## (undated) DEVICE — Device

## (undated) DEVICE — SYRINGE, 60 CC, LUER LOCK, MONOJECT, W/O CAP, LF

## (undated) DEVICE — TUBING, SUCTION, CONNECTING, STERILE 0.25 X 120 IN., LF

## (undated) DEVICE — SYRINGE, 20 CC, LUER LOCK, MONOJECT, W/O CAP, LF

## (undated) DEVICE — TIP, SUCTION, YANKAUER, BULB, ADULT

## (undated) DEVICE — STATLOCK, FOLEY SWIVEL, SILICONE TRICOT

## (undated) DEVICE — GOWN, SURGICAL, SMARTGOWN, XLARGE, STERILE

## (undated) DEVICE — COVER, TABLE, 44 X 75 IN, DISPOSABLE, LF, STERILE

## (undated) DEVICE — KIT, MINOR, DOUBLE BASIN

## (undated) DEVICE — SUTURE, ETHILON, 2-0, 18 IN, FS, BLACK, BX/12

## (undated) DEVICE — SUTURE, MONOCRYL, 4-0, 18 IN, PS2, UNDYED